# Patient Record
Sex: MALE | Race: BLACK OR AFRICAN AMERICAN | NOT HISPANIC OR LATINO | Employment: FULL TIME | URBAN - METROPOLITAN AREA
[De-identification: names, ages, dates, MRNs, and addresses within clinical notes are randomized per-mention and may not be internally consistent; named-entity substitution may affect disease eponyms.]

---

## 2020-02-25 ENCOUNTER — NURSE TRIAGE (OUTPATIENT)
Dept: ADMINISTRATIVE | Facility: CLINIC | Age: 25
End: 2020-02-25

## 2020-02-25 ENCOUNTER — HOSPITAL ENCOUNTER (EMERGENCY)
Facility: HOSPITAL | Age: 25
Discharge: HOME | End: 2020-02-25
Attending: EMERGENCY MEDICINE
Payer: COMMERCIAL

## 2020-02-25 VITALS
SYSTOLIC BLOOD PRESSURE: 131 MMHG | WEIGHT: 191.56 LBS | OXYGEN SATURATION: 97 % | HEART RATE: 77 BPM | HEIGHT: 71 IN | DIASTOLIC BLOOD PRESSURE: 83 MMHG | RESPIRATION RATE: 16 BRPM | TEMPERATURE: 98 F | BODY MASS INDEX: 26.82 KG/M2

## 2020-02-25 DIAGNOSIS — H61.22 IMPACTED CERUMEN OF LEFT EAR: Primary | ICD-10-CM

## 2020-02-25 PROCEDURE — 25000003 PHARM REV CODE 250: Mod: ER | Performed by: EMERGENCY MEDICINE

## 2020-02-25 PROCEDURE — 99283 EMERGENCY DEPT VISIT LOW MDM: CPT | Mod: 25,ER

## 2020-02-25 PROCEDURE — 69209 REMOVE IMPACTED EAR WAX UNI: CPT | Mod: LT,ER

## 2020-02-25 RX ADMIN — CARBAMIDE PEROXIDE 6.5% 5 DROP: 6.5 LIQUID AURICULAR (OTIC) at 07:02

## 2020-02-26 NOTE — TELEPHONE ENCOUNTER
Pt's wife calling to ask if EDs help with earwax removal. I stated that they do but based on protocol he can wait till tomorrow to try to schedule appointment with ENT or PCP. She stated they would go to ED tonight.    Reason for Disposition   [1] Earwax problem AND [2] not willing to try CARE ADVICE    Additional Information   Negative: Injury to the ear   Negative: Injury to ear canal from cotton swab (e.g., Q-tip) or doctor's ear exam   Negative: Foreign body stuck in the ear (e.g., bug, piece of cotton)   Negative: Sudden onset of hearing loss   Negative: Dizziness is main symptom   Negative: Pain or discomfort in or around ear is main symptom   Negative: Patient sounds very sick or weak to the triager   Negative: Sudden onset of ear pain or bleeding after long - thin object was inserted into the ear canal (e.g., pencil, Q-tip)   Negative: [1] Ear pain after ear syringing (i.e., squirting liquid into ear canal to remove wax) AND [2] severe   Negative: [1] Ear pain after ear syringing (i.e., squirting liquid into ear canal to remove wax) AND [2] persists > 1 hour   Negative: Walking is very unsteady  (Exception: brief dizziness that occurs with ear syringing)   Negative: Redness or rash of outer ear   Negative: Pus from the ear canal (e.g., yellow or green discharge)   Negative: History of ear drum perforation, tubes or ear surgery   Negative: Complete hearing loss in either ear   Negative: Diabetes   Negative: [1] Earwax problem AND [2] no improvement using CARE ADVICE    Protocols used: EARWAX-A-AH

## 2020-02-26 NOTE — ED PROVIDER NOTES
"Chief Complaint  Chief Complaint   Patient presents with    Cerumen Impaction     pt reports "trouble hearing out left ear" x 2 days. States "I think it's ear wax buildup."       HPI  Cailin Ochoa is a 24 y.o. male who presents with left ear fullness and slight hearing loss.  Patient feels that he has a wax buildup to his left ear.  He has not had this previously.  He denies any trauma or insertion of any foreign objects besides an occasional Q-tip.  He reports this has been ongoing for a couple of days.  No previous history.  No fever vomiting or diarrhea or nasal congestion or cough or neck pain.  No swelling or lymph node swelling. Patient does not have any significant discomfort    Past medical history  History reviewed. No pertinent past medical history.    Current Medications  No current facility-administered medications for this encounter.   No current outpatient medications on file.    Allergies  Review of patient's allergies indicates:  No Known Allergies    Surgical history  History reviewed. No pertinent surgical history.    Social history  Social History     Socioeconomic History    Marital status:      Spouse name: Not on file    Number of children: Not on file    Years of education: Not on file    Highest education level: Not on file   Occupational History    Not on file   Social Needs    Financial resource strain: Not on file    Food insecurity:     Worry: Not on file     Inability: Not on file    Transportation needs:     Medical: Not on file     Non-medical: Not on file   Tobacco Use    Smoking status: Never Smoker   Substance and Sexual Activity    Alcohol use: No     Alcohol/week: 0.0 standard drinks    Drug use: No    Sexual activity: Yes   Lifestyle    Physical activity:     Days per week: Not on file     Minutes per session: Not on file    Stress: Not on file   Relationships    Social connections:     Talks on phone: Not on file     Gets together: Not on file     " "Attends [employer] service: Not on file     Active member of club or organization: Not on file     Attends meetings of clubs or organizations: Not on file     Relationship status: Not on file   Other Topics Concern    Not on file   Social History Narrative    Lives with his mother. 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        Family History  History reviewed. No pertinent family history.    Review of systems  Constitutional: No fever or weakness.  Eyes: No redness, pain, or discharge.  Skin: No rash, abscess, or laceration.  Neurologic: No new focal weakness or sensory changes.  All systems otherwise negative except as noted in ROS and HPI    Physical Exam  Vital signs: /83   Pulse 77   Temp 98.2 °F (36.8 °C) (Oral)   Resp 16   Ht 5' 11" (1.803 m)   Wt 86.9 kg (191 lb 9.3 oz)   SpO2 97%   BMI 26.72 kg/m²   Constitutional: No acute distress.  Well developed, alert, oriented and appropriate.  HENT: Normocephalic, atraumatic. There is a cerumen impaction to the left ear.  Cerumen noted on the right side but not a complete impaction.    Eyes: PERRL, EOMI, normal conjunctiva.  Neck: Normal range of motion, no tenderness; supple.  Respiratory: Nonlabored breathing with normal breath sounds.  Cardiovascular: RRR with no pulse deficit.  GI: Soft, nontender, no rebound or guarding.  Musculoskeletal: Normal ROM, no tenderness, injury, or edema.  Skin: Warm, dry skin without infection or injury.  Neurologic: Normal motor, sensation with no new focal deficit.  Psychiatric: Affect normal, judgement normal, mood normal.  No SI, HI, and not gravely disabled.    Labs  Pertinent labs reviewed (see chart for details)  Labs Reviewed - No data to display    ECG  No results found for this or any previous visit.  ECG interpreted by ED MD    Radiology  No orders to display       Procedures  Procedures    Medications   carbamide peroxide 6.5 % otic solution 5 drop (5 drops Left Ear Given 2/25/20 1951)       ED " course and medical decision making         Patient had deeper ox and irrigation performed on the left side with successful irrigation of all visible waxy elements.  Normal examination after that.  Patient will continue home treatments to remove the non impacted cerumen of the right ear.     Disposition    Patient discharged in stable condition      Final impression  1. Impacted cerumen of left ear        Critical care time spent with this patient was 0 minutes excluding the procedure time.          Praveen Olea MD  02/25/20 6097

## 2021-03-29 ENCOUNTER — OFFICE VISIT (OUTPATIENT)
Dept: FAMILY MEDICINE | Facility: CLINIC | Age: 26
End: 2021-03-29
Payer: COMMERCIAL

## 2021-03-29 VITALS
HEART RATE: 70 BPM | DIASTOLIC BLOOD PRESSURE: 78 MMHG | SYSTOLIC BLOOD PRESSURE: 122 MMHG | BODY MASS INDEX: 27.98 KG/M2 | OXYGEN SATURATION: 96 % | WEIGHT: 200.63 LBS

## 2021-03-29 DIAGNOSIS — Z53.21 PATIENT LEFT WITHOUT BEING SEEN: Primary | ICD-10-CM

## 2021-03-29 PROCEDURE — 1126F PR PAIN SEVERITY QUANTIFIED, NO PAIN PRESENT: ICD-10-PCS | Mod: S$GLB,,, | Performed by: FAMILY MEDICINE

## 2021-03-29 PROCEDURE — 3008F PR BODY MASS INDEX (BMI) DOCUMENTED: ICD-10-PCS | Mod: CPTII,S$GLB,, | Performed by: FAMILY MEDICINE

## 2021-03-29 PROCEDURE — 3008F BODY MASS INDEX DOCD: CPT | Mod: CPTII,S$GLB,, | Performed by: FAMILY MEDICINE

## 2021-03-29 PROCEDURE — 99999 PR PBB SHADOW E&M-EST. PATIENT-LVL III: ICD-10-PCS | Mod: PBBFAC,,, | Performed by: FAMILY MEDICINE

## 2021-03-29 PROCEDURE — 1126F AMNT PAIN NOTED NONE PRSNT: CPT | Mod: S$GLB,,, | Performed by: FAMILY MEDICINE

## 2021-03-29 PROCEDURE — 99999 PR PBB SHADOW E&M-EST. PATIENT-LVL III: CPT | Mod: PBBFAC,,, | Performed by: FAMILY MEDICINE

## 2021-03-29 PROCEDURE — 99499 UNLISTED E&M SERVICE: CPT | Mod: S$GLB,,, | Performed by: FAMILY MEDICINE

## 2021-03-29 PROCEDURE — 99499 NO LOS: ICD-10-PCS | Mod: S$GLB,,, | Performed by: FAMILY MEDICINE

## 2021-05-06 ENCOUNTER — PATIENT MESSAGE (OUTPATIENT)
Dept: RESEARCH | Facility: HOSPITAL | Age: 26
End: 2021-05-06

## 2021-07-01 ENCOUNTER — PATIENT MESSAGE (OUTPATIENT)
Dept: ADMINISTRATIVE | Facility: OTHER | Age: 26
End: 2021-07-01

## 2021-09-05 ENCOUNTER — NURSE TRIAGE (OUTPATIENT)
Dept: ADMINISTRATIVE | Facility: CLINIC | Age: 26
End: 2021-09-05

## 2025-01-30 ENCOUNTER — NURSE TRIAGE (OUTPATIENT)
Dept: ADMINISTRATIVE | Facility: CLINIC | Age: 30
End: 2025-01-30

## 2025-01-30 NOTE — TELEPHONE ENCOUNTER
3 days ago, uncomfortable feeling in L testicle. Not swollen, not red, no drainage and no fever. 1/10 for pain scale.He does not call it pain, discomfort. However it has been constant for the last 3 days. He is a , and it notices it more while driving.  Triage done- dispo see today. He is presently in [place] and will not be back until tomorrow. Advised I would send messages to provider also. Strict ED protocol given. Verb understanding.   Reason for Disposition   Pain comes and goes (intermittent) and present > 24 hours    Additional Information   Negative: Rash or color change of scrotum BUT no swelling or pain   Negative: Followed a genital area injury (e.g., penis, scrotum)   Negative: Swelling of scrotum is main symptom   Negative: Inguinal hernia pain (e.g., known hernia previously diagnosed by a doctor or NP/PA)   Negative: SEVERE pain (e.g., excruciating)   Negative: Swollen scrotum   Negative: Constant pain in scrotum or testicle and present > 1 hour   Negative: Vomiting   Negative: Fever > 100.4 F  (38.0 C)   Negative: Patient sounds very sick or weak to the triager   Negative: Scrotum looks infected (e.g., draining sore, ulcer, red rash)    Protocols used: Scrotum Pain-A-OH

## 2025-01-31 ENCOUNTER — HOSPITAL ENCOUNTER (EMERGENCY)
Facility: HOSPITAL | Age: 30
Discharge: HOME | End: 2025-01-31
Attending: EMERGENCY MEDICINE

## 2025-01-31 VITALS
HEART RATE: 96 BPM | OXYGEN SATURATION: 99 % | DIASTOLIC BLOOD PRESSURE: 96 MMHG | WEIGHT: 225 LBS | SYSTOLIC BLOOD PRESSURE: 156 MMHG | BODY MASS INDEX: 31.38 KG/M2 | TEMPERATURE: 98 F | RESPIRATION RATE: 17 BRPM

## 2025-01-31 DIAGNOSIS — K40.90 UNILATERAL INGUINAL HERNIA WITHOUT OBSTRUCTION OR GANGRENE, RECURRENCE NOT SPECIFIED: Primary | ICD-10-CM

## 2025-01-31 DIAGNOSIS — N50.819 TESTICLE PAIN: ICD-10-CM

## 2025-01-31 LAB
BILIRUB UR QL STRIP: NEGATIVE
CLARITY UR REFRACT.AUTO: CLEAR
COLOR UR AUTO: YELLOW
GLUCOSE UR QL STRIP: NEGATIVE
HGB UR QL STRIP: NEGATIVE
KETONES UR QL STRIP: NEGATIVE
LEUKOCYTE ESTERASE UR QL STRIP: NEGATIVE
NITRITE UR QL STRIP: NEGATIVE
PH UR STRIP: 8.5 [PH] (ref 5–8)
PROT UR QL STRIP: NEGATIVE
SP GR UR STRIP: 1.01 (ref 1–1.03)
URN SPEC COLLECT METH UR: NORMAL
UROBILINOGEN UR STRIP-ACNC: 1 EU/DL

## 2025-01-31 PROCEDURE — 81003 URINALYSIS AUTO W/O SCOPE: CPT | Mod: ER

## 2025-01-31 PROCEDURE — 99284 EMERGENCY DEPT VISIT MOD MDM: CPT | Mod: 25,ER

## 2025-01-31 RX ORDER — IBUPROFEN 600 MG/1
600 TABLET ORAL EVERY 6 HOURS PRN
Qty: 20 TABLET | Refills: 0 | Status: SHIPPED | OUTPATIENT
Start: 2025-01-31

## 2025-01-31 NOTE — ED TRIAGE NOTES
Reports to ED c c/o L testicle pain x 3 days. Occurred suddenly. Denies heavy lifting. Denies any Gu symptoms or fever.   
Yes

## 2025-02-01 NOTE — ED PROVIDER NOTES
Encounter Date: 1/31/2025       History     Chief Complaint   Patient presents with    Testicle Pain     Reports to ED c c/o L testicle pain x 3 days. Occurred suddenly. Denies heavy lifting. Denies any Gu symptoms or fever.      29-year-old male presents today for evaluation of left-sided testicular pain that began 3 days ago.  Patient denies performing any strenuous activity prior to onset of pain.  No medications taken for his discomfort.  He denies any trauma or injury, dysuria hematuria, abdominal pain, fever, chills, penile discharge.    The history is provided by the patient and medical records. No  was used.     Review of patient's allergies indicates:  No Known Allergies  History reviewed. No pertinent past medical history.  History reviewed. No pertinent surgical history.  No family history on file.  Social History     Tobacco Use    Smoking status: Never   Substance Use Topics    Alcohol use: No     Alcohol/week: 0.0 standard drinks of alcohol    Drug use: No     Review of Systems   Constitutional:  Negative for fever.   HENT:  Negative for sore throat.    Respiratory:  Negative for shortness of breath.    Cardiovascular:  Negative for chest pain.   Gastrointestinal:  Negative for nausea.   Genitourinary:  Positive for testicular pain. Negative for dysuria, penile pain and scrotal swelling.   Musculoskeletal:  Negative for back pain.   Skin:  Negative for rash.   Neurological:  Negative for weakness.   Hematological:  Does not bruise/bleed easily.       Physical Exam     Initial Vitals [01/31/25 1728]   BP Pulse Resp Temp SpO2   (!) 156/96 96 17 97.8 °F (36.6 °C) 99 %      MAP       --         Physical Exam    Nursing note and vitals reviewed.  Constitutional: He appears well-developed and well-nourished. He is not diaphoretic. No distress.   HENT:   Head: Normocephalic.   Cardiovascular:  Normal rate.           Pulmonary/Chest: No respiratory distress.   Abdominal: Abdomen is soft. He  exhibits no distension. There is no abdominal tenderness.   Genitourinary:    Testes and penis normal.   Right testis shows no tenderness. Left testis shows no tenderness. Circumcised.         Neurological: He is oriented to person, place, and time. GCS score is 15. GCS eye subscore is 4. GCS verbal subscore is 5. GCS motor subscore is 6.   Skin: Skin is warm and dry. Capillary refill takes less than 2 seconds.   Psychiatric: He has a normal mood and affect. His behavior is normal.         ED Course   Procedures  Labs Reviewed   URINALYSIS, REFLEX TO URINE CULTURE       Result Value    Specimen UA Urine, Clean Catch      Color, UA Yellow      Appearance, UA Clear      pH, UA 8.5      Specific Gravity, UA 1.010      Protein, UA Negative      Glucose, UA Negative      Ketones, UA Negative      Bilirubin (UA) Negative      Occult Blood UA Negative      Nitrite, UA Negative      Urobilinogen, UA 1.0      Leukocytes, UA Negative      Narrative:     Preferred Collection Type->Urine, Clean Catch  Specimen Source->Urine          Imaging Results              US Scrotum And Testicles (Final result)  Result time 01/31/25 19:13:00      Final result by Gregory Liao MD (01/31/25 19:13:00)                   Impression:      1.  Fat containing left inguinal hernia.    2.  Trace right hydrocele.    3.  Otherwise, normal testicular ultrasound.  Negative for intratesticular mass or torsion.      Electronically signed by: Gregory Liao MD  Date:    01/31/2025  Time:    19:13               Narrative:    EXAMINATION:  US SCROTUM AND TESTICLES    CLINICAL HISTORY:  Testicular pain, unspecified    TECHNIQUE:  Sonography of the scrotum and testes.    COMPARISON:  None.    FINDINGS:  Right Testicle:    *Size: 4.6 x 3.4 x 2.5 cm  *Appearance: Normal.  *Flow: Normal arterial and venous flow, 6 cm/second  *Epididymis: Normal.  *Hydrocele: Trace  *Varicocele: None.  .    Left Testicle:    *Size: 4.7 x 3.2 x 2.1 cm  *Appearance:  "Normal.  *Flow: Normal arterial and venous flow, 5 cm/second  *Epididymis: Normal.  *Hydrocele: None.  *Varicocele: None.  .    Other findings: Fat containing left inguinal hernia noted.                                       Medications - No data to display  Medical Decision Making  29-year-old male presents today for evaluation of left-sided testicular pain that began 3 days ago.  On exam patient is nontoxic appearing and in no acute distress.  Vitals are stable.  Left-sided groin and testicular pain is not reproducible on exam; no swelling no lesions.    Differential includes but isn't limited to:  UTI, STI, epididymitis, arthritis, abscess, hernia, testicular torsion, other    UA unremarkable.  Ultrasound revealing left inguinal hernia with trace right hydrocele.  He has good blood flow to both testicles.  He was encouraged to take OTC medications for his discomfort.  Referral placed for General surgery follow up, he was given strict ED return precautions.      Amount and/or Complexity of Data Reviewed  Labs:  Decision-making details documented in ED Course.  Radiology: ordered. Decision-making details documented in ED Course.    Risk  Prescription drug management.               ED Course as of 02/01/25 0048   Fri Jan 31, 2025   1845 Urinalysis, Reflex to Urine Culture Urine, Clean Catch  Unremarkable [EP]   1918 US Scrotum And Testicles  "1.  Fat containing left inguinal hernia.     2.  Trace right hydrocele.     3.  Otherwise, normal testicular ultrasound.  Negative for intratesticular mass or torsion." [EP]      ED Course User Index  [EP] Pamela Porter, THOMAS                           Clinical Impression:  Final diagnoses:  [N50.819] Testicle pain  [K40.90] Unilateral inguinal hernia without obstruction or gangrene, recurrence not specified (Primary)          ED Disposition Condition    Discharge Stable          ED Prescriptions       Medication Sig Dispense Start Date End Date Auth. Provider    ibuprofen " (ADVIL,MOTRIN) 600 MG tablet Take 1 tablet (600 mg total) by mouth every 6 (six) hours as needed for Pain. 20 tablet 1/31/2025 -- Pamela Porter PA-C          Follow-up Information       Follow up With Specialties Details Why Contact [facility] - Emergency Dept Emergency Medicine  If symptoms worsen [address] [facility]  Emergency Department  [facility] [id number] [phone]             Pamela Porter PA-C  02/01/25 0101

## 2025-02-03 ENCOUNTER — TELEPHONE (OUTPATIENT)
Dept: FAMILY MEDICINE | Facility: CLINIC | Age: 30
End: 2025-02-03

## 2025-02-03 NOTE — TELEPHONE ENCOUNTER
----- Message from [employer] sent at 1/31/2025  2:54 PM CST -----  .Type:  Sooner Apoointment Request    Caller is requesting a sooner appointment.  Caller declined first available appointment listed below.  Caller will not accept being placed on the waitlist and is requesting a message be sent to doctor.  Name of Caller:pt  When is the first available appointment?2/6  Symptoms:check up  Would the patient rather a call back or a response [facility]? Call back  Best Call Back Number:[phone]  Additional Information:

## 2025-02-03 NOTE — TELEPHONE ENCOUNTER
Pt was contacted on 01/31/25 (note did not save in epic). Pt advised that he has been having left testicular pain x3 days with no relief. No recent trauma, injury, fall, or other obvious reasoning. Pt was advised that since Dr. Crespo does not have any sooner appts available and the pt does not have  PCP that it would be best to go to an ED to rule out anything serious such as testicular torsion. Pt was expressed understanding and expressed that he would like to keep the schedule appt to [facility] with Dr. Crespo on 02/06/25.

## 2025-02-06 ENCOUNTER — OFFICE VISIT (OUTPATIENT)
Dept: FAMILY MEDICINE | Facility: CLINIC | Age: 30
End: 2025-02-06

## 2025-02-06 VITALS
OXYGEN SATURATION: 98 % | HEART RATE: 88 BPM | TEMPERATURE: 98 F | RESPIRATION RATE: 16 BRPM | WEIGHT: 229.19 LBS | DIASTOLIC BLOOD PRESSURE: 70 MMHG | HEIGHT: 72 IN | SYSTOLIC BLOOD PRESSURE: 108 MMHG | BODY MASS INDEX: 31.04 KG/M2

## 2025-02-06 DIAGNOSIS — Z00.00 ANNUAL PHYSICAL EXAM: ICD-10-CM

## 2025-02-06 DIAGNOSIS — Z11.4 ENCOUNTER FOR SCREENING FOR HIV: ICD-10-CM

## 2025-02-06 DIAGNOSIS — Z11.59 NEED FOR HEPATITIS C SCREENING TEST: ICD-10-CM

## 2025-02-06 DIAGNOSIS — B35.1 TINEA UNGUIUM: ICD-10-CM

## 2025-02-06 DIAGNOSIS — N43.3 RIGHT HYDROCELE: ICD-10-CM

## 2025-02-06 DIAGNOSIS — Z00.00 ENCOUNTER FOR MEDICAL EXAMINATION TO ESTABLISH CARE: ICD-10-CM

## 2025-02-06 DIAGNOSIS — B36.0 TINEA VERSICOLOR: ICD-10-CM

## 2025-02-06 DIAGNOSIS — E66.811 CLASS 1 OBESITY DUE TO EXCESS CALORIES WITHOUT SERIOUS COMORBIDITY WITH BODY MASS INDEX (BMI) OF 31.0 TO 31.9 IN ADULT: ICD-10-CM

## 2025-02-06 DIAGNOSIS — R21 SKIN RASH: ICD-10-CM

## 2025-02-06 DIAGNOSIS — E66.09 CLASS 1 OBESITY DUE TO EXCESS CALORIES WITHOUT SERIOUS COMORBIDITY WITH BODY MASS INDEX (BMI) OF 31.0 TO 31.9 IN ADULT: ICD-10-CM

## 2025-02-06 DIAGNOSIS — K40.90 NON-RECURRENT UNILATERAL INGUINAL HERNIA WITHOUT OBSTRUCTION OR GANGRENE: ICD-10-CM

## 2025-02-06 DIAGNOSIS — Z11.3 ROUTINE SCREENING FOR STI (SEXUALLY TRANSMITTED INFECTION): ICD-10-CM

## 2025-02-06 PROCEDURE — 99999 PR PBB SHADOW E&M-EST. PATIENT-LVL III: CPT | Mod: PBBFAC,,, | Performed by: STUDENT IN AN ORGANIZED HEALTH CARE EDUCATION/TRAINING PROGRAM

## 2025-02-06 PROCEDURE — 99385 PREV VISIT NEW AGE 18-39: CPT | Mod: 25,S$PBB,, | Performed by: STUDENT IN AN ORGANIZED HEALTH CARE EDUCATION/TRAINING PROGRAM

## 2025-02-06 PROCEDURE — 99213 OFFICE O/P EST LOW 20 MIN: CPT | Mod: PBBFAC,PN | Performed by: STUDENT IN AN ORGANIZED HEALTH CARE EDUCATION/TRAINING PROGRAM

## 2025-02-06 NOTE — PROGRESS NOTES
[facility] Note    Chief Complaint      Chief Complaint   Patient presents with    Establish Care     History of Present Illness      Don Simeon  is a 30yo M recently diagnosed with left inguinal hernia and trace right side hydrocele who presents for an annual wellness visit and to establish care with a new primary care physician. He also has concerns about skin issues and a dark toenail.    Cailin reports noticing skin changes, including hyperpigmented spots on his neck and widespread over his chest. He expresses concern about his left foot's middle toe nail darkening, resembling the typically darker pinky toe nail, which is unusual for him.    He reports a recent episode of discomfort in his left testicle area, beginning about a week ago. The discomfort was unusual rather than painful, rating 1-2 out of 10 in severity, continuous for 3 days and exacerbated when sitting for long periods or lying down to sleep. Due to this concern, he sought medical attention and was diagnosed with a small left inguinal hernia and trace right hydrocele via ultrasound.    He describes ongoing skin issues behind his ear and on his buttocks. The skin behind his ear occasionally breaks open, causing a burning sensation upon contact, eventually healing with air exposure. The issue on his buttocks has persisted for months, with xerotic skin that fissures and breaks open, causing burning discomfort. He has been applying a healing cream on the affected area for months without improvement.    He is a , sitting for extended periods, diet predominantly . He reports having a significant sugar addiction.    He denies any known family history of diabetes, SLE or lower extremity edema.    MEDICAL HISTORY:  - Hernia: Left inguinal hernia  - Hydrocele: Trace right hydrocele  - Tinea versicolor: Fungal infection of the skin  - Onychomycosis: Fungal infection of the toenail  - Tetanus shot received in 2009  (when patient was a freshman in high school)    TEST RESULTS:  - Urinalysis: Previously completed    IMAGING:  - Scrotal ultrasound: Previously completed, revealed left inguinal hernia and trace right hydrocele    SOCIAL HISTORY:  - Occupation:       ROS:  General: -fever, -chills, -fatigue, -weight gain, -weight loss  Eyes: -vision changes, -redness, -discharge  ENT: -ear pain, -nasal congestion, -sore throat  Cardiovascular: -chest pain, -palpitations, -lower extremity edema  Respiratory: -cough, -shortness of breath  Gastrointestinal: -abdominal pain, -nausea, -vomiting, -diarrhea, -constipation, -blood in stool  Genitourinary: -dysuria, -hematuria, -frequency  Musculoskeletal: -joint pain, -muscle pain  Skin: -rash, +lesion, +dry skin  Neurological: -headache, -dizziness, -numbness, -tingling  Psychiatric: -anxiety, -depression, -sleep difficulty  Male Genitourinary: +testicular pain          Cailin Ochoa is a 29 y.o. male who presents with:    Problem List Addressed This Visit:  1. Encounter for medical examination to establish care    2. Tinea versicolor    3. Tinea unguium    4. Skin rash  -     JAMEE Screen w/Reflex; Future; Expected date: 02/06/2025    5. Non-recurrent unilateral inguinal hernia without obstruction or gangrene    6. Right hydrocele    7. Class 1 obesity due to excess calories without serious comorbidity with body mass index (BMI) of 31.0 to 31.9 in adult    8. Annual physical exam  -     Lipid Panel; Future; Expected date: 02/06/2025  -     TSH; Future; Expected date: 02/06/2025  -     Hemoglobin A1C; Future; Expected date: 02/06/2025  -     Comprehensive Metabolic Panel; Future; Expected date: 02/06/2025  -     CBC Auto Differential; Future; Expected date: 02/06/2025    9. Need for hepatitis C screening test  -     Hepatitis C Antibody; Future; Expected date: 02/06/2025    10. Encounter for screening for HIV  -     HIV 1/2 Ag/Ab (4th Gen); Future; Expected date:  "02/06/2025    11. Routine screening for STI (sexually transmitted infection)  -     C. trachomatis/N. gonorrhoeae by AMP DNA [facility]; Urine; Future; Expected date: 02/06/2025  -     Treponema Pallidium Antibodies IgG, IgM; Future; Expected date: 02/06/2025           Outpatient Encounter Medications as of 2/6/2025   Medication Sig Dispense Refill    ibuprofen (ADVIL,MOTRIN) 600 MG tablet Take 1 tablet (600 mg total) by mouth every 6 (six) hours as needed for Pain. 20 tablet 0     No facility-administered encounter medications on file as of 2/6/2025.        Review of patient's allergies indicates:  No Known Allergies    Physical Exam      Vital Signs  Temp: 98.1 °F (36.7 °C)  Temp Source: Temporal  Pulse: 88  Resp: 16  SpO2: 98 %  BP: 108/70  BP Location: Right arm  Patient Position: Sitting  Pain Score: 0-No pain  Height and Weight  Height: 6' (182.9 cm)  Weight: 103.9 kg (229 lb 2.7 oz)  BSA (Calculated - sq m): 2.3 sq meters  BMI (Calculated): 31.1  Weight in (lb) to have BMI = 25: 183.9]    Physical Exam    General: No acute distress. Well-developed. Well-nourished.  Eyes: EOMI. Sclerae anicteric.  HENT: Normocephalic. Atraumatic. Nares patent. Moist oral mucosa.  Ears: Bilateral TMs clear. Bilateral EACs clear. Ears filled with wax.  Cardiovascular: Regular rate. Regular rhythm. No murmurs. No rubs. No gallops. Normal S1, S2.  Respiratory: Normal respiratory effort. Clear to auscultation bilaterally. No rales. No rhonchi. No wheezing.  Abdomen: Soft. Non-tender. Non-distended. Normoactive bowel sounds.  Musculoskeletal: No  obvious deformity.  Extremities: No lower extremity edema.  Neurological: Alert & oriented x3. No slurred speech. Normal gait.  Psychiatric: Normal mood. Normal affect. Good insight. Good judgment.  Skin: Warm. Dry. No rash.          Laboratory:  CBC:  No results for input(s): "WBC", "RBC", "HGB", "HCT", "PLT", "MCV", "MCH", "MCHC" in the last 2160 hours.  CMP:  No results for input(s): "GLU", " ""CALCIUM", "ALBUMIN", "PROT", "NA", "K", "CO2", "CL", "BUN", "ALKPHOS", "ALT", "AST", "BILITOT" in the last 2160 hours.    Invalid input(s): "CREATININ"  URINALYSIS:  Recent Labs   Lab Result Units 01/31/25  1824   Color, UA  Yellow   Specific Gravity, UA  1.010   pH, UA  8.5   Protein, UA  Negative   Nitrite, UA  Negative   Leukocytes, UA  Negative   Urobilinogen, UA EU/dL 1.0      LIPIDS:  No results for input(s): "TSH", "HDL", "CHOL", "TRIG", "LDLCALC", "CHOLHDL", "NONHDLCHOL", "TOTALCHOLEST" in the last 2160 hours.  TSH:  No results for input(s): "TSH" in the last 2160 hours.  A1C:  No results for input(s): "HGBA1C" in the last 2160 hours.    Radiology:      Assessment/Plan     Cailin Ochoa is a 29 y.o.male with:    1. Encounter for medical examination to establish care    2. Tinea versicolor    3. Tinea unguium    4. Skin rash  - JAMEE Screen w/Reflex; Future    5. Non-recurrent unilateral inguinal hernia without obstruction or gangrene    6. Right hydrocele    7. Class 1 obesity due to excess calories without serious comorbidity with body mass index (BMI) of 31.0 to 31.9 in adult    8. Annual physical exam  - Lipid Panel; Future  - TSH; Future  - Hemoglobin A1C; Future  - Comprehensive Metabolic Panel; Future  - CBC Auto Differential; Future    9. Need for hepatitis C screening test  - Hepatitis C Antibody; Future    10. Encounter for screening for HIV  - HIV 1/2 Ag/Ab (4th Gen); Future    11. Routine screening for STI (sexually transmitted infection)  - C. trachomatis/N. gonorrhoeae by AMP DNA [facility]; Urine; Future  - Treponema Pallidium Antibodies IgG, IgM; Future    Assessment & Plan      LEFT INGUINAL HERNIA:  - Assessed left inguinal hernia based on previous ultrasound results.  - Referred the patient to general surgeon for evaluation of left inguinal hernia.  - Scheduled follow-up visit next month for hernia evaluation with general surgeon..  - Ultrasound revealed left inguinal hernia and trace of right " hydrocele.  - Recommend getting the hernia checked by a General Surgeon.  - Educated the patient about the potential worsening of the condition if left untreated.    RIGHT HYDROCELE:  - Assessed right hydrocele based on previous ultrasound results.  - Ultrasound revealed trace of right hydrocele.    TINEA VERSICOLOR:  - Diagnosed tinea versicolor as likely cause for patient's skin issues, given distribution pattern and symptoms.  - Explained tinea versicolor as a fungal infection that flares up with heat and humidity.  - Rayshad reported skin issues, including spots and darkening of neck.  - Recommend keeping skin dry to prevent fungal growth.  - Advised immediate shower after workouts to prevent fungal growth.  - Planned to check liver function tests before prescribing oral antifungal medication.    TINEA UNGUIUM:  - Evaluated left foot for fungal nail infection.  - Examined the affected toe and confirmed fungal infection of the nail.  - Explained the lengthy treatment process for nail fungal infections.  - Planned to check liver function tests before prescribing oral antifungal medication.    CERUMEN DEBRIS IN EAR CANAL  - Noted significant earwax buildup, planned removal procedure.  - Scheduled earwax removal procedure for March 10th.    RECURRENT SKIN RASH/ULCER  - Suspected possible lupus due to persistent skin issues behind ear and on buttocks.  - Planned screening and potential dermatology referral for biopsy if needed.  - Provided information on lupus screening and potential need for skin biopsy.  - Ordered lupus screening blood test to be performed with fasting labs.  - Rayshad reported ongoing skin issues behind ear and on buttocks.  - Considered possibility of lupus due to non-healing skin issues.  - Cailin has been using healing cream without improvement.  - Planned to screen for lupus and potentially refer to dermatology for biopsy.    BMI 31  -class I obesity  - Rayshad acknowledged feeling unhealthy  and having a sugar addiction.  - Recognized patient's unhealthy eating habits due to job as .    ANNUAL PHYSICAL   -preventive counseling provided  -labs due ordered  -decline all due vaccines despite extensive education on risks, benefits , verbalized understanding   LABS:  - Ordered fasting labs for March 10th.  - Planned comprehensive labs.    - Scheduled follow-up on March 10th for fasting labs and earwax removal.  - Instructed the patient to contact the office if any questions or concerns arise before next appointment.          -Continue current medications and maintain follow up with specialists.      Patient verbalizes understanding and agrees with current treatment plan.    This note was generated with the assistance of ambient listening technology. I attest to having reviewed and edited the generated note for accuracy, though some syntax or spelling errors may persist. Please contact the author of this note for any clarification.         Jessica Dangelo MD  Internal Medicine   [facility]

## 2025-03-07 ENCOUNTER — TELEPHONE (OUTPATIENT)
Dept: FAMILY MEDICINE | Facility: CLINIC | Age: 30
End: 2025-03-07

## 2025-03-07 NOTE — TELEPHONE ENCOUNTER
----- Message from Stewart sent at 3/7/2025  4:08 PM CST -----  Type:  Sooner Appointment RequestCaller is requesting a sooner appointment.  Caller declined first available appointment listed below.  Caller will not accept being placed on the waitlist and is requesting a message be sent to doctor.Name of Caller:pt When is the first available appointment?03/10 pt had to cxl because he is out of townSymptoms: wax removal Would the patient rather a call back or a response via [facility]? Call Best Call Back Number:[phone] Additional Information:

## 2025-03-14 ENCOUNTER — OFFICE VISIT (OUTPATIENT)
Dept: SURGERY | Facility: CLINIC | Age: 30
End: 2025-03-14

## 2025-03-14 VITALS
WEIGHT: 229.06 LBS | TEMPERATURE: 97 F | BODY MASS INDEX: 31.03 KG/M2 | HEART RATE: 96 BPM | DIASTOLIC BLOOD PRESSURE: 80 MMHG | HEIGHT: 72 IN | SYSTOLIC BLOOD PRESSURE: 121 MMHG

## 2025-03-14 DIAGNOSIS — K40.90 UNILATERAL INGUINAL HERNIA WITHOUT OBSTRUCTION OR GANGRENE, RECURRENCE NOT SPECIFIED: ICD-10-CM

## 2025-03-14 PROCEDURE — 99213 OFFICE O/P EST LOW 20 MIN: CPT | Mod: PBBFAC,PN | Performed by: SURGERY

## 2025-03-14 PROCEDURE — 99999 PR PBB SHADOW E&M-EST. PATIENT-LVL III: CPT | Mod: PBBFAC,,, | Performed by: SURGERY

## 2025-03-14 PROCEDURE — 99204 OFFICE O/P NEW MOD 45 MIN: CPT | Mod: S$PBB,,, | Performed by: SURGERY

## 2025-03-15 NOTE — PROGRESS NOTES
"[facility]  OUTPATIENT H&P    REASON FOR VISIT/CC: groin pain    HPI: Cailin Ochoa is a 29 y.o. male with mild intermittent left groin pain.  No visible protrusion.  U/S shows small fat containing hernia.  Pt denies other symptoms.    I have reviewed the patient's chart including prior progress notes, procedures and testing.     ROS:   Review of Systems   All other systems reviewed and are negative.      PROBLEM LIST:  Problem List[1]      HISTORY  Past Medical History:   Diagnosis Date    Unilateral inguinal hernia, without obstruction or gangrene, not specified as recurrent        No past surgical history on file.    Social History[2]    No family history on file.      MEDS:  Medications Ordered Prior to Encounter[3]    ALLERGIES:  Review of patient's allergies indicates:  No Known Allergies      VITALS:  Vitals:    03/14/25 0909   BP: 121/80   Pulse: 96   Temp: 97.4 °F (36.3 °C)         PHYSICAL EXAM:  Physical Exam  Constitutional:       Appearance: Normal appearance.   Pulmonary:      Effort: Pulmonary effort is normal.   Abdominal:      Palpations: Abdomen is soft.   Genitourinary:     Comments: Small palpable LIH with coughing  Skin:     General: Skin is warm and dry.   Neurological:      Mental Status: He is oriented to person, place, and time.           LABS:  No results found for: "WBC", "RBC", "HGB", "HCT", "PLT"  No results found for: "GLU", "NA", "K", "CL", "CO2", "BUN", "CREATININE", "CALCIUM"  No results found for: "ALT", "AST", "GGT", "ALKPHOS", "BILITOT"  No results found for: "MG", "PHOS"    STUDIES:  U/S groin images and reports were personally reviewed.        ASSESSMENT & PLAN:  29 y.o. male with small LIH, pt will benefit from surgery at some point.  He is interested in surgery and will let me know when he is ready to schedule.      Homero Parker M.D., F.A.C.S.  [facility] General Surgery  [facility]            [1] There is no problem list on " file for this patient.  [2]   Social History  Tobacco Use    Smoking status: Never   Substance Use Topics    Alcohol use: No     Alcohol/week: 0.0 standard drinks of alcohol    Drug use: No   [3]   Current Outpatient Medications on File Prior to Visit   Medication Sig Dispense Refill    ibuprofen (ADVIL,MOTRIN) 600 MG tablet Take 1 tablet (600 mg total) by mouth every 6 (six) hours as needed for Pain. 20 tablet 0     No current facility-administered medications on file prior to visit.

## 2025-03-18 ENCOUNTER — OFFICE VISIT (OUTPATIENT)
Dept: FAMILY MEDICINE | Facility: CLINIC | Age: 30
End: 2025-03-18

## 2025-03-18 VITALS
WEIGHT: 228.81 LBS | BODY MASS INDEX: 30.99 KG/M2 | TEMPERATURE: 98 F | SYSTOLIC BLOOD PRESSURE: 130 MMHG | DIASTOLIC BLOOD PRESSURE: 80 MMHG | OXYGEN SATURATION: 98 % | RESPIRATION RATE: 19 BRPM | HEART RATE: 88 BPM | HEIGHT: 72 IN

## 2025-03-18 DIAGNOSIS — B36.0 TINEA VERSICOLOR: ICD-10-CM

## 2025-03-18 DIAGNOSIS — H61.23 CERUMEN DEBRIS ON TYMPANIC MEMBRANE OF BOTH EARS: Primary | ICD-10-CM

## 2025-03-18 DIAGNOSIS — B35.1 TINEA MANUUM, PEDIS, AND UNGUIUM: ICD-10-CM

## 2025-03-18 DIAGNOSIS — B35.3 TINEA MANUUM, PEDIS, AND UNGUIUM: ICD-10-CM

## 2025-03-18 DIAGNOSIS — B35.2 TINEA MANUUM, PEDIS, AND UNGUIUM: ICD-10-CM

## 2025-03-18 PROCEDURE — 69210 REMOVE IMPACTED EAR WAX UNI: CPT | Mod: S$PBB,,, | Performed by: STUDENT IN AN ORGANIZED HEALTH CARE EDUCATION/TRAINING PROGRAM

## 2025-03-18 PROCEDURE — 69209 REMOVE IMPACTED EAR WAX UNI: CPT | Mod: 50,PBBFAC,PN | Performed by: STUDENT IN AN ORGANIZED HEALTH CARE EDUCATION/TRAINING PROGRAM

## 2025-03-18 PROCEDURE — 99213 OFFICE O/P EST LOW 20 MIN: CPT | Mod: PBBFAC,PN | Performed by: STUDENT IN AN ORGANIZED HEALTH CARE EDUCATION/TRAINING PROGRAM

## 2025-03-18 PROCEDURE — 99999 PR PBB SHADOW E&M-EST. PATIENT-LVL III: CPT | Mod: PBBFAC,,, | Performed by: STUDENT IN AN ORGANIZED HEALTH CARE EDUCATION/TRAINING PROGRAM

## 2025-03-18 PROCEDURE — 99214 OFFICE O/P EST MOD 30 MIN: CPT | Mod: S$PBB,25,, | Performed by: STUDENT IN AN ORGANIZED HEALTH CARE EDUCATION/TRAINING PROGRAM

## 2025-03-18 RX ORDER — FLUCONAZOLE 150 MG/1
300 TABLET ORAL
Qty: 6 TABLET | Refills: 0 | Status: SHIPPED | OUTPATIENT
Start: 2025-03-18 | End: 2025-04-08

## 2025-03-18 RX ORDER — CICLOPIROX 80 MG/ML
SOLUTION TOPICAL NIGHTLY
Qty: 18 ML | Refills: 1 | Status: SHIPPED | OUTPATIENT
Start: 2025-03-18 | End: 2025-06-16

## 2025-03-18 NOTE — PROCEDURES
Ear Cerumen Removal    Date/Time: 3/18/2025 1:00 PM    Performed by: Jessica Dangelo MD  Authorized by: Jessica Dangelo MD    Consent Done?:  Yes (Verbal)  Location details:  Both ears  Procedure type: curette and irrigation    Cerumen  Removal Results:  Cerumen completely removed  Patient tolerance:  Patient tolerated the procedure well with no immediate complications

## 2025-03-19 ENCOUNTER — RESULTS FOLLOW-UP (OUTPATIENT)
Dept: FAMILY MEDICINE | Facility: CLINIC | Age: 30
End: 2025-03-19